# Patient Record
Sex: MALE | Race: BLACK OR AFRICAN AMERICAN | URBAN - METROPOLITAN AREA
[De-identification: names, ages, dates, MRNs, and addresses within clinical notes are randomized per-mention and may not be internally consistent; named-entity substitution may affect disease eponyms.]

---

## 2017-03-08 ENCOUNTER — TELEPHONE (OUTPATIENT)
Dept: FAMILY MEDICINE CLINIC | Age: 57
End: 2017-03-08

## 2017-03-08 DIAGNOSIS — Z12.5 SCREENING FOR PROSTATE CANCER: Primary | ICD-10-CM

## 2017-03-08 DIAGNOSIS — Z00.00 ANNUAL PHYSICAL EXAM: ICD-10-CM

## 2017-04-10 ENCOUNTER — CLINICAL DOCUMENTATION (OUTPATIENT)
Dept: INTERNAL MEDICINE CLINIC | Age: 57
End: 2017-04-10

## 2017-04-10 ENCOUNTER — OFFICE VISIT (OUTPATIENT)
Dept: FAMILY MEDICINE CLINIC | Age: 57
End: 2017-04-10

## 2017-04-10 VITALS
OXYGEN SATURATION: 98 % | HEIGHT: 72 IN | SYSTOLIC BLOOD PRESSURE: 124 MMHG | RESPIRATION RATE: 14 BRPM | BODY MASS INDEX: 27.6 KG/M2 | DIASTOLIC BLOOD PRESSURE: 82 MMHG | HEART RATE: 70 BPM | WEIGHT: 203.8 LBS

## 2017-04-10 DIAGNOSIS — Z11.4 SCREENING FOR HIV (HUMAN IMMUNODEFICIENCY VIRUS): ICD-10-CM

## 2017-04-10 DIAGNOSIS — I10 ESSENTIAL HYPERTENSION: ICD-10-CM

## 2017-04-10 DIAGNOSIS — L91.8 SKIN TAG: ICD-10-CM

## 2017-04-10 DIAGNOSIS — N40.0 BENIGN PROSTATIC HYPERPLASIA WITHOUT LOWER URINARY TRACT SYMPTOMS, UNSPECIFIED MORPHOLOGY: ICD-10-CM

## 2017-04-10 DIAGNOSIS — Z00.00 ANNUAL PHYSICAL EXAM: Primary | ICD-10-CM

## 2017-04-10 DIAGNOSIS — Z11.59 NEED FOR HEPATITIS C SCREENING TEST: ICD-10-CM

## 2017-04-10 DIAGNOSIS — Z00.00 ANNUAL PHYSICAL EXAM: ICD-10-CM

## 2017-04-10 DIAGNOSIS — N52.9 ERECTILE DYSFUNCTION, UNSPECIFIED ERECTILE DYSFUNCTION TYPE: ICD-10-CM

## 2017-04-10 LAB
A/G RATIO: 1.6 (ref 1.1–2.2)
ALBUMIN SERPL-MCNC: 4.5 G/DL (ref 3.4–5)
ALP BLD-CCNC: 52 U/L (ref 40–129)
ALT SERPL-CCNC: 31 U/L (ref 10–40)
ANION GAP SERPL CALCULATED.3IONS-SCNC: 15 MMOL/L (ref 3–16)
AST SERPL-CCNC: 23 U/L (ref 15–37)
BASOPHILS ABSOLUTE: 0 K/UL (ref 0–0.2)
BASOPHILS RELATIVE PERCENT: 0.6 %
BILIRUB SERPL-MCNC: 0.7 MG/DL (ref 0–1)
BILIRUBIN, POC: NORMAL
BLOOD URINE, POC: NORMAL
BUN BLDV-MCNC: 11 MG/DL (ref 7–20)
CALCIUM SERPL-MCNC: 9.1 MG/DL (ref 8.3–10.6)
CHLORIDE BLD-SCNC: 101 MMOL/L (ref 99–110)
CHOLESTEROL, TOTAL: 198 MG/DL (ref 0–199)
CLARITY, POC: CLEAR
CO2: 26 MMOL/L (ref 21–32)
COLOR, POC: YELLOW
CREAT SERPL-MCNC: 1.1 MG/DL (ref 0.9–1.3)
EOSINOPHILS ABSOLUTE: 0.1 K/UL (ref 0–0.6)
EOSINOPHILS RELATIVE PERCENT: 3.7 %
GFR AFRICAN AMERICAN: >60
GFR NON-AFRICAN AMERICAN: >60
GLOBULIN: 2.9 G/DL
GLUCOSE BLD-MCNC: 90 MG/DL (ref 70–99)
GLUCOSE URINE, POC: NORMAL
HCT VFR BLD CALC: 48.4 % (ref 40.5–52.5)
HDLC SERPL-MCNC: 44 MG/DL (ref 40–60)
HEMOGLOBIN: 16 G/DL (ref 13.5–17.5)
HEPATITIS C ANTIBODY INTERPRETATION: NORMAL
KETONES, POC: NORMAL
LDL CHOLESTEROL CALCULATED: 133 MG/DL
LEUKOCYTE EST, POC: NORMAL
LYMPHOCYTES ABSOLUTE: 0.8 K/UL (ref 1–5.1)
LYMPHOCYTES RELATIVE PERCENT: 34.9 %
MCH RBC QN AUTO: 28.2 PG (ref 26–34)
MCHC RBC AUTO-ENTMCNC: 33.1 G/DL (ref 31–36)
MCV RBC AUTO: 85.4 FL (ref 80–100)
MONOCYTES ABSOLUTE: 0.3 K/UL (ref 0–1.3)
MONOCYTES RELATIVE PERCENT: 12.5 %
NEUTROPHILS ABSOLUTE: 1.1 K/UL (ref 1.7–7.7)
NEUTROPHILS RELATIVE PERCENT: 48.3 %
NITRITE, POC: NORMAL
PDW BLD-RTO: 13.4 % (ref 12.4–15.4)
PH, POC: 6
PLATELET # BLD: 143 K/UL (ref 135–450)
PMV BLD AUTO: 10 FL (ref 5–10.5)
POTASSIUM SERPL-SCNC: 4.4 MMOL/L (ref 3.5–5.1)
PROSTATE SPECIFIC ANTIGEN: 0.67 NG/ML (ref 0–4)
PROTEIN, POC: NORMAL
RBC # BLD: 5.67 M/UL (ref 4.2–5.9)
SODIUM BLD-SCNC: 142 MMOL/L (ref 136–145)
SPECIFIC GRAVITY, POC: 1.01
TOTAL PROTEIN: 7.4 G/DL (ref 6.4–8.2)
TRIGL SERPL-MCNC: 104 MG/DL (ref 0–150)
UROBILINOGEN, POC: 0.2
VLDLC SERPL CALC-MCNC: 21 MG/DL
WBC # BLD: 2.3 K/UL (ref 4–11)

## 2017-04-10 PROCEDURE — 99396 PREV VISIT EST AGE 40-64: CPT | Performed by: FAMILY MEDICINE

## 2017-04-10 PROCEDURE — 81002 URINALYSIS NONAUTO W/O SCOPE: CPT | Performed by: FAMILY MEDICINE

## 2017-04-11 LAB — HIV-1 AND HIV-2 ANTIBODIES: NORMAL

## 2017-04-12 LAB — TESTOSTERONE TOTAL-MALE: 478 NG/DL (ref 300–890)

## 2017-09-19 ENCOUNTER — TELEPHONE (OUTPATIENT)
Dept: FAMILY MEDICINE CLINIC | Age: 57
End: 2017-09-19

## 2017-09-21 ENCOUNTER — OFFICE VISIT (OUTPATIENT)
Dept: FAMILY MEDICINE CLINIC | Age: 57
End: 2017-09-21

## 2017-09-21 VITALS
RESPIRATION RATE: 14 BRPM | HEART RATE: 63 BPM | SYSTOLIC BLOOD PRESSURE: 132 MMHG | DIASTOLIC BLOOD PRESSURE: 86 MMHG | WEIGHT: 213 LBS | BODY MASS INDEX: 29.29 KG/M2 | OXYGEN SATURATION: 98 %

## 2017-09-21 DIAGNOSIS — I10 ESSENTIAL HYPERTENSION: Primary | ICD-10-CM

## 2017-09-21 DIAGNOSIS — Z13.220 LIPID SCREENING: ICD-10-CM

## 2017-09-21 LAB
CHOLESTEROL, TOTAL: 199 MG/DL (ref 0–199)
GLUCOSE BLD-MCNC: 120 MG/DL (ref 70–99)
HDLC SERPL-MCNC: 41 MG/DL (ref 40–60)
LDL CHOLESTEROL CALCULATED: 134 MG/DL
TRIGL SERPL-MCNC: 119 MG/DL (ref 0–150)
VLDLC SERPL CALC-MCNC: 24 MG/DL

## 2017-09-21 PROCEDURE — 99213 OFFICE O/P EST LOW 20 MIN: CPT | Performed by: FAMILY MEDICINE

## 2017-09-21 ASSESSMENT — PATIENT HEALTH QUESTIONNAIRE - PHQ9
1. LITTLE INTEREST OR PLEASURE IN DOING THINGS: 0
2. FEELING DOWN, DEPRESSED OR HOPELESS: 0
SUM OF ALL RESPONSES TO PHQ QUESTIONS 1-9: 0
SUM OF ALL RESPONSES TO PHQ9 QUESTIONS 1 & 2: 0

## 2017-09-25 ENCOUNTER — TELEPHONE (OUTPATIENT)
Dept: FAMILY MEDICINE CLINIC | Age: 57
End: 2017-09-25

## 2017-09-25 DIAGNOSIS — R73.01 ELEVATED FASTING GLUCOSE: Primary | ICD-10-CM

## 2017-09-30 LAB
ESTIMATED AVERAGE GLUCOSE: 137 MG/DL
HBA1C MFR BLD: 6.4 %

## 2017-10-03 ENCOUNTER — TELEPHONE (OUTPATIENT)
Dept: FAMILY MEDICINE CLINIC | Age: 57
End: 2017-10-03

## 2017-10-03 ENCOUNTER — CARE COORDINATION (OUTPATIENT)
Dept: CARE COORDINATION | Age: 57
End: 2017-10-03

## 2017-10-03 NOTE — CARE COORDINATION
Per PCP's request pt mailed DM Class Flyers and the following DM Ed materials to review. Pt aware he can call writer once received and he has started reviewing materials. Pt mailed the following items:  - What is Diabetes  - Know Your Numbers  - Hypo/Hyperglycemia S/S & Tx  - Your Guide to Better office Visits  - Carb Counting & meal Planning  - Diabetes & You  - Contact information    If pt doesn't return call in next 2-3 weeks, writer will call pt.      Arnulfo CORTESN, RN Care Coordinator  [address] [place] [address]  [phone]

## 2017-10-03 NOTE — TELEPHONE ENCOUNTER
Patient was concerned about his ranges and if he was diabetic. He is currently residing in [place] and is going to have a hard time coming to the education class. He gave me his [place] address (his parent's house) and Sandee Nyhan is mailing him information out there. She also put her card in there in case he had any questions for her.

## 2017-10-06 ENCOUNTER — CARE COORDINATOR VISIT (OUTPATIENT)
Dept: CARE COORDINATION | Age: 57
End: 2017-10-06

## 2017-10-06 ASSESSMENT — PATIENT HEALTH QUESTIONNAIRE - PHQ9: SUM OF ALL RESPONSES TO PHQ QUESTIONS 1-9: 0

## 2017-10-17 ENCOUNTER — CARE COORDINATION (OUTPATIENT)
Dept: CARE COORDINATION | Age: 57
End: 2017-10-17

## 2017-10-17 NOTE — CARE COORDINATION
Outreach call attempted. Unable to reach pt. Message left with reason for call requesting call back.      Lillie CORTESN, RN Care Coordinator  [address] [place] [address]  [phone]

## 2018-08-17 ENCOUNTER — TELEPHONE (OUTPATIENT)
Dept: ADMINISTRATIVE | Age: 58
End: 2018-08-17

## 2018-08-17 DIAGNOSIS — R73.03 PREDIABETES: Primary | ICD-10-CM

## 2018-08-17 DIAGNOSIS — I10 ESSENTIAL HYPERTENSION: ICD-10-CM

## 2018-08-17 DIAGNOSIS — N40.0 BENIGN PROSTATIC HYPERPLASIA WITHOUT LOWER URINARY TRACT SYMPTOMS: ICD-10-CM

## 2018-08-27 ENCOUNTER — OFFICE VISIT (OUTPATIENT)
Dept: FAMILY MEDICINE CLINIC | Age: 58
End: 2018-08-27

## 2018-08-27 VITALS
OXYGEN SATURATION: 97 % | SYSTOLIC BLOOD PRESSURE: 122 MMHG | HEIGHT: 72 IN | HEART RATE: 72 BPM | DIASTOLIC BLOOD PRESSURE: 78 MMHG | WEIGHT: 209 LBS | RESPIRATION RATE: 15 BRPM | BODY MASS INDEX: 28.31 KG/M2

## 2018-08-27 DIAGNOSIS — N40.0 BENIGN PROSTATIC HYPERPLASIA WITHOUT LOWER URINARY TRACT SYMPTOMS: ICD-10-CM

## 2018-08-27 DIAGNOSIS — I10 ESSENTIAL HYPERTENSION: ICD-10-CM

## 2018-08-27 DIAGNOSIS — Z00.00 ANNUAL PHYSICAL EXAM: ICD-10-CM

## 2018-08-27 DIAGNOSIS — R73.03 PREDIABETES: ICD-10-CM

## 2018-08-27 DIAGNOSIS — D70.9 NEUTROPENIA, UNSPECIFIED TYPE (HCC): ICD-10-CM

## 2018-08-27 DIAGNOSIS — E55.9 VITAMIN D DEFICIENCY: ICD-10-CM

## 2018-08-27 DIAGNOSIS — Z00.00 ANNUAL PHYSICAL EXAM: Primary | ICD-10-CM

## 2018-08-27 LAB
A/G RATIO: 1.6 (ref 1.1–2.2)
ALBUMIN SERPL-MCNC: 4.4 G/DL (ref 3.4–5)
ALP BLD-CCNC: 55 U/L (ref 40–129)
ALT SERPL-CCNC: 28 U/L (ref 10–40)
ANION GAP SERPL CALCULATED.3IONS-SCNC: 11 MMOL/L (ref 3–16)
AST SERPL-CCNC: 25 U/L (ref 15–37)
BASOPHILS ABSOLUTE: 0 K/UL (ref 0–0.2)
BASOPHILS RELATIVE PERCENT: 0.7 %
BILIRUB SERPL-MCNC: 0.6 MG/DL (ref 0–1)
BILIRUBIN, POC: NORMAL
BLOOD URINE, POC: NORMAL
BUN BLDV-MCNC: 11 MG/DL (ref 7–20)
CALCIUM SERPL-MCNC: 9.1 MG/DL (ref 8.3–10.6)
CHLORIDE BLD-SCNC: 98 MMOL/L (ref 99–110)
CHOLESTEROL, TOTAL: 186 MG/DL (ref 0–199)
CLARITY, POC: CLEAR
CO2: 29 MMOL/L (ref 21–32)
COLOR, POC: YELLOW
CREAT SERPL-MCNC: 1.2 MG/DL (ref 0.9–1.3)
EOSINOPHILS ABSOLUTE: 0.3 K/UL (ref 0–0.6)
EOSINOPHILS RELATIVE PERCENT: 9.4 %
GFR AFRICAN AMERICAN: >60
GFR NON-AFRICAN AMERICAN: >60
GLOBULIN: 2.8 G/DL
GLUCOSE BLD-MCNC: 111 MG/DL (ref 70–99)
GLUCOSE URINE, POC: NORMAL
HCT VFR BLD CALC: 45.7 % (ref 40.5–52.5)
HDLC SERPL-MCNC: 43 MG/DL (ref 40–60)
HEMOGLOBIN: 15.3 G/DL (ref 13.5–17.5)
KETONES, POC: NORMAL
LDL CHOLESTEROL CALCULATED: 125 MG/DL
LEUKOCYTE EST, POC: NORMAL
LYMPHOCYTES ABSOLUTE: 0.7 K/UL (ref 1–5.1)
LYMPHOCYTES RELATIVE PERCENT: 24.5 %
MCH RBC QN AUTO: 28.3 PG (ref 26–34)
MCHC RBC AUTO-ENTMCNC: 33.4 G/DL (ref 31–36)
MCV RBC AUTO: 84.6 FL (ref 80–100)
MONOCYTES ABSOLUTE: 0.3 K/UL (ref 0–1.3)
MONOCYTES RELATIVE PERCENT: 10.5 %
NEUTROPHILS ABSOLUTE: 1.5 K/UL (ref 1.7–7.7)
NEUTROPHILS RELATIVE PERCENT: 54.9 %
NITRITE, POC: NORMAL
PDW BLD-RTO: 13.5 % (ref 12.4–15.4)
PH, POC: 6
PLATELET # BLD: 140 K/UL (ref 135–450)
PMV BLD AUTO: 9.7 FL (ref 5–10.5)
POTASSIUM SERPL-SCNC: 3.9 MMOL/L (ref 3.5–5.1)
PROSTATE SPECIFIC ANTIGEN: 0.7 NG/ML (ref 0–4)
PROTEIN, POC: NORMAL
RBC # BLD: 5.41 M/UL (ref 4.2–5.9)
SODIUM BLD-SCNC: 138 MMOL/L (ref 136–145)
SPECIFIC GRAVITY, POC: 1.01
TOTAL PROTEIN: 7.2 G/DL (ref 6.4–8.2)
TRIGL SERPL-MCNC: 91 MG/DL (ref 0–150)
TSH REFLEX: 1.61 UIU/ML (ref 0.27–4.2)
UROBILINOGEN, POC: 0.2
VITAMIN D 25-HYDROXY: 19.1 NG/ML
VLDLC SERPL CALC-MCNC: 18 MG/DL
WBC # BLD: 2.8 K/UL (ref 4–11)

## 2018-08-27 PROCEDURE — 99396 PREV VISIT EST AGE 40-64: CPT | Performed by: FAMILY MEDICINE

## 2018-08-27 PROCEDURE — 90471 IMMUNIZATION ADMIN: CPT | Performed by: FAMILY MEDICINE

## 2018-08-27 PROCEDURE — 81002 URINALYSIS NONAUTO W/O SCOPE: CPT | Performed by: FAMILY MEDICINE

## 2018-08-27 PROCEDURE — 90750 HZV VACC RECOMBINANT IM: CPT | Performed by: FAMILY MEDICINE

## 2018-08-27 NOTE — PROGRESS NOTES
SUBJECTIVE:   Serina Jha is a 63 y.o. male presenting for his annual checkup. Intermittently not sleeping well. Has had a lot of work stress with new job. Has not tried any OTC meds. Patient Active Problem List   Diagnosis    Hypertension    Vitamin D deficiency    ED (erectile dysfunction)    Neutropenia (HCC)    BPH (benign prostatic hyperplasia)    Prediabetes       Past Medical History:   Diagnosis Date    BPH (benign prostatic hyperplasia)     ED (erectile dysfunction)     Hypertension     diet controlled    Neutropenia ([address]     Prediabetes     Vitamin D deficiency 5/2011       Past Surgical History:   Procedure Laterality Date    COLONOSCOPY  6/5/14    10 years       Social History     Social History    Marital status:      Spouse name: N/A    Number of children: 2    Years of education: N/A     Occupational History     at [employer]      Social History Main Topics    Smoking status: Never Smoker    Smokeless tobacco: Never Used    Alcohol use Yes      Comment: Occ.  Drug use: No    Sexual activity: Not Asked     Other Topics Concern    None     Social History Narrative    None       Family History   Problem Relation Age of Onset    Cancer Father         prostate    Cancer Maternal Grandmother         colon    Diabetes Brother         type II    Diabetes Son         type I       No current outpatient prescriptions on file. No current facility-administered medications for this visit.         Allergies: Lisinopril     Health Maintenance   Topic Date Due    Shingles Vaccine (1 of 2 - 2 Dose Series) 08/24/2011    Flu vaccine (1) 09/21/2018 (Originally 9/1/2018)    A1C test (Diabetic or Prediabetic)  09/29/2018    DTaP/Tdap/Td vaccine (2 - Tdap) 08/01/2020    Lipid screen  09/21/2022    Colon cancer screen colonoscopy  06/05/2024    Hepatitis C screen  Completed    HIV screen  Completed           ROS:    Skin: no changing moles, abnormal

## 2018-08-28 DIAGNOSIS — E55.9 VITAMIN D DEFICIENCY: Primary | ICD-10-CM

## 2018-08-28 DIAGNOSIS — E11.9 TYPE 2 DIABETES MELLITUS WITHOUT COMPLICATION, WITHOUT LONG-TERM CURRENT USE OF INSULIN (HCC): ICD-10-CM

## 2018-08-28 LAB
ESTIMATED AVERAGE GLUCOSE: 139.9 MG/DL
HBA1C MFR BLD: 6.5 %

## 2018-09-06 ENCOUNTER — TELEPHONE (OUTPATIENT)
Dept: FAMILY MEDICINE CLINIC | Age: 58
End: 2018-09-06

## 2018-09-06 DIAGNOSIS — E55.9 VITAMIN D DEFICIENCY: ICD-10-CM

## 2018-09-06 NOTE — TELEPHONE ENCOUNTER
Patient received blood test results but has more questions. He would not specify but would like someone to call him back.

## 2018-10-01 ENCOUNTER — OFFICE VISIT (OUTPATIENT)
Dept: FAMILY MEDICINE CLINIC | Age: 58
End: 2018-10-01
Payer: COMMERCIAL

## 2018-10-01 VITALS
HEART RATE: 78 BPM | WEIGHT: 206 LBS | DIASTOLIC BLOOD PRESSURE: 80 MMHG | RESPIRATION RATE: 16 BRPM | OXYGEN SATURATION: 97 % | SYSTOLIC BLOOD PRESSURE: 124 MMHG | BODY MASS INDEX: 28.33 KG/M2

## 2018-10-01 DIAGNOSIS — E78.00 PURE HYPERCHOLESTEROLEMIA: ICD-10-CM

## 2018-10-01 DIAGNOSIS — E55.9 VITAMIN D DEFICIENCY: ICD-10-CM

## 2018-10-01 DIAGNOSIS — D70.9 NEUTROPENIA, UNSPECIFIED TYPE (HCC): ICD-10-CM

## 2018-10-01 DIAGNOSIS — E11.9 TYPE 2 DIABETES MELLITUS WITHOUT COMPLICATION, WITHOUT LONG-TERM CURRENT USE OF INSULIN (HCC): Primary | ICD-10-CM

## 2018-10-01 LAB
CREATININE URINE POCT: 200
MICROALBUMIN/CREAT 24H UR: 150 MG/G{CREAT}
MICROALBUMIN/CREAT UR-RTO: >300

## 2018-10-01 PROCEDURE — G8419 CALC BMI OUT NRM PARAM NOF/U: HCPCS | Performed by: FAMILY MEDICINE

## 2018-10-01 PROCEDURE — 3017F COLORECTAL CA SCREEN DOC REV: CPT | Performed by: FAMILY MEDICINE

## 2018-10-01 PROCEDURE — 1036F TOBACCO NON-USER: CPT | Performed by: FAMILY MEDICINE

## 2018-10-01 PROCEDURE — 99214 OFFICE O/P EST MOD 30 MIN: CPT | Performed by: FAMILY MEDICINE

## 2018-10-01 PROCEDURE — 82044 UR ALBUMIN SEMIQUANTITATIVE: CPT | Performed by: FAMILY MEDICINE

## 2018-10-01 PROCEDURE — 3044F HG A1C LEVEL LT 7.0%: CPT | Performed by: FAMILY MEDICINE

## 2018-10-01 PROCEDURE — G8484 FLU IMMUNIZE NO ADMIN: HCPCS | Performed by: FAMILY MEDICINE

## 2018-10-01 PROCEDURE — G8427 DOCREV CUR MEDS BY ELIG CLIN: HCPCS | Performed by: FAMILY MEDICINE

## 2018-10-01 PROCEDURE — 2022F DILAT RTA XM EVC RTNOPTHY: CPT | Performed by: FAMILY MEDICINE

## 2018-10-01 ASSESSMENT — PATIENT HEALTH QUESTIONNAIRE - PHQ9
SUM OF ALL RESPONSES TO PHQ QUESTIONS 1-9: 0
SUM OF ALL RESPONSES TO PHQ QUESTIONS 1-9: 0
SUM OF ALL RESPONSES TO PHQ9 QUESTIONS 1 & 2: 0
1. LITTLE INTEREST OR PLEASURE IN DOING THINGS: 0
2. FEELING DOWN, DEPRESSED OR HOPELESS: 0

## 2019-01-14 ENCOUNTER — OFFICE VISIT (OUTPATIENT)
Dept: FAMILY MEDICINE CLINIC | Age: 59
End: 2019-01-14
Payer: COMMERCIAL

## 2019-01-14 VITALS
SYSTOLIC BLOOD PRESSURE: 126 MMHG | RESPIRATION RATE: 14 BRPM | HEART RATE: 67 BPM | HEIGHT: 72 IN | BODY MASS INDEX: 28.17 KG/M2 | DIASTOLIC BLOOD PRESSURE: 80 MMHG | WEIGHT: 208 LBS | OXYGEN SATURATION: 98 %

## 2019-01-14 DIAGNOSIS — E11.9 TYPE 2 DIABETES MELLITUS WITHOUT COMPLICATION, WITHOUT LONG-TERM CURRENT USE OF INSULIN (HCC): Primary | ICD-10-CM

## 2019-01-14 DIAGNOSIS — D70.9 NEUTROPENIA, UNSPECIFIED TYPE (HCC): ICD-10-CM

## 2019-01-14 DIAGNOSIS — H91.92 HEARING TROUBLE, LEFT: ICD-10-CM

## 2019-01-14 DIAGNOSIS — R53.83 FATIGUE, UNSPECIFIED TYPE: ICD-10-CM

## 2019-01-14 DIAGNOSIS — N52.9 ERECTILE DYSFUNCTION, UNSPECIFIED ERECTILE DYSFUNCTION TYPE: ICD-10-CM

## 2019-01-14 DIAGNOSIS — D22.9 NUMEROUS MOLES: ICD-10-CM

## 2019-01-14 DIAGNOSIS — I10 ESSENTIAL HYPERTENSION: ICD-10-CM

## 2019-01-14 DIAGNOSIS — E11.9 TYPE 2 DIABETES MELLITUS WITHOUT COMPLICATION, WITHOUT LONG-TERM CURRENT USE OF INSULIN (HCC): ICD-10-CM

## 2019-01-14 DIAGNOSIS — E78.00 PURE HYPERCHOLESTEROLEMIA: ICD-10-CM

## 2019-01-14 DIAGNOSIS — E55.9 VITAMIN D DEFICIENCY: ICD-10-CM

## 2019-01-14 LAB
A/G RATIO: 1.6 (ref 1.1–2.2)
ALBUMIN SERPL-MCNC: 4.5 G/DL (ref 3.4–5)
ALP BLD-CCNC: 47 U/L (ref 40–129)
ALT SERPL-CCNC: 26 U/L (ref 10–40)
ANION GAP SERPL CALCULATED.3IONS-SCNC: 12 MMOL/L (ref 3–16)
AST SERPL-CCNC: 21 U/L (ref 15–37)
BASOPHILS ABSOLUTE: 0 K/UL (ref 0–0.2)
BASOPHILS RELATIVE PERCENT: 0.4 %
BILIRUB SERPL-MCNC: 0.6 MG/DL (ref 0–1)
BUN BLDV-MCNC: 14 MG/DL (ref 7–20)
CALCIUM SERPL-MCNC: 9 MG/DL (ref 8.3–10.6)
CHLORIDE BLD-SCNC: 100 MMOL/L (ref 99–110)
CO2: 28 MMOL/L (ref 21–32)
CREAT SERPL-MCNC: 1.1 MG/DL (ref 0.9–1.3)
EOSINOPHILS ABSOLUTE: 0.2 K/UL (ref 0–0.6)
EOSINOPHILS RELATIVE PERCENT: 6.3 %
ESTIMATED AVERAGE GLUCOSE: 116.9 MG/DL
GFR AFRICAN AMERICAN: >60
GFR NON-AFRICAN AMERICAN: >60
GLOBULIN: 2.9 G/DL
GLUCOSE BLD-MCNC: 91 MG/DL (ref 70–99)
HBA1C MFR BLD: 5.7 %
HCT VFR BLD CALC: 46.5 % (ref 40.5–52.5)
HEMOGLOBIN: 15.2 G/DL (ref 13.5–17.5)
LYMPHOCYTES ABSOLUTE: 0.8 K/UL (ref 1–5.1)
LYMPHOCYTES RELATIVE PERCENT: 32.3 %
MCH RBC QN AUTO: 27.9 PG (ref 26–34)
MCHC RBC AUTO-ENTMCNC: 32.7 G/DL (ref 31–36)
MCV RBC AUTO: 85.1 FL (ref 80–100)
MONOCYTES ABSOLUTE: 0.3 K/UL (ref 0–1.3)
MONOCYTES RELATIVE PERCENT: 10.3 %
NEUTROPHILS ABSOLUTE: 1.2 K/UL (ref 1.7–7.7)
NEUTROPHILS RELATIVE PERCENT: 50.7 %
PDW BLD-RTO: 12.9 % (ref 12.4–15.4)
PLATELET # BLD: 140 K/UL (ref 135–450)
PMV BLD AUTO: 9.8 FL (ref 5–10.5)
POTASSIUM SERPL-SCNC: 3.9 MMOL/L (ref 3.5–5.1)
RBC # BLD: 5.47 M/UL (ref 4.2–5.9)
SODIUM BLD-SCNC: 140 MMOL/L (ref 136–145)
TOTAL PROTEIN: 7.4 G/DL (ref 6.4–8.2)
TSH REFLEX: 2.54 UIU/ML (ref 0.27–4.2)
VITAMIN D 25-HYDROXY: 55.9 NG/ML
WBC # BLD: 2.5 K/UL (ref 4–11)

## 2019-01-14 PROCEDURE — G8419 CALC BMI OUT NRM PARAM NOF/U: HCPCS | Performed by: FAMILY MEDICINE

## 2019-01-14 PROCEDURE — 99214 OFFICE O/P EST MOD 30 MIN: CPT | Performed by: FAMILY MEDICINE

## 2019-01-14 PROCEDURE — G8484 FLU IMMUNIZE NO ADMIN: HCPCS | Performed by: FAMILY MEDICINE

## 2019-01-14 PROCEDURE — 3046F HEMOGLOBIN A1C LEVEL >9.0%: CPT | Performed by: FAMILY MEDICINE

## 2019-01-14 PROCEDURE — 90471 IMMUNIZATION ADMIN: CPT | Performed by: FAMILY MEDICINE

## 2019-01-14 PROCEDURE — 2022F DILAT RTA XM EVC RTNOPTHY: CPT | Performed by: FAMILY MEDICINE

## 2019-01-14 PROCEDURE — 1036F TOBACCO NON-USER: CPT | Performed by: FAMILY MEDICINE

## 2019-01-14 PROCEDURE — 90732 PPSV23 VACC 2 YRS+ SUBQ/IM: CPT | Performed by: FAMILY MEDICINE

## 2019-01-14 PROCEDURE — 3017F COLORECTAL CA SCREEN DOC REV: CPT | Performed by: FAMILY MEDICINE

## 2019-01-14 PROCEDURE — G8427 DOCREV CUR MEDS BY ELIG CLIN: HCPCS | Performed by: FAMILY MEDICINE

## 2019-01-16 LAB
SEX HORMONE BINDING GLOBULIN: 47 NMOL/L (ref 11–80)
TESTOSTERONE FREE-NONMALE: 72.8 PG/ML (ref 47–244)
TESTOSTERONE TOTAL: 444 NG/DL (ref 220–1000)

## 2019-01-17 ENCOUNTER — TELEPHONE (OUTPATIENT)
Dept: FAMILY MEDICINE CLINIC | Age: 59
End: 2019-01-17

## 2019-01-17 LAB
HIV AG/AB: NORMAL
HIV ANTIGEN: NORMAL
HIV-1 ANTIBODY: NORMAL
HIV-2 AB: NORMAL

## 2019-05-23 ENCOUNTER — TELEPHONE (OUTPATIENT)
Dept: FAMILY MEDICINE CLINIC | Age: 59
End: 2019-05-23

## 2025-01-19 ENCOUNTER — HOSPITAL ENCOUNTER (EMERGENCY)
Age: 65
Discharge: ANOTHER HOSPITAL | End: 2025-01-19
Attending: EMERGENCY MEDICINE
Payer: COMMERCIAL

## 2025-01-19 VITALS
WEIGHT: 201.7 LBS | HEART RATE: 87 BPM | RESPIRATION RATE: 18 BRPM | DIASTOLIC BLOOD PRESSURE: 95 MMHG | BODY MASS INDEX: 28.24 KG/M2 | TEMPERATURE: 97.4 F | HEIGHT: 71 IN | SYSTOLIC BLOOD PRESSURE: 141 MMHG | OXYGEN SATURATION: 97 %

## 2025-01-19 DIAGNOSIS — T23.029A BURN INJURY OF SKIN OF FINGER: Primary | ICD-10-CM

## 2025-01-19 DIAGNOSIS — T20.20XA FACIAL BURN, SECOND DEGREE, INITIAL ENCOUNTER: ICD-10-CM

## 2025-01-19 DIAGNOSIS — Z86.39 HISTORY OF DIABETES MELLITUS: ICD-10-CM

## 2025-01-19 DIAGNOSIS — I10 ESSENTIAL HYPERTENSION: ICD-10-CM

## 2025-01-19 LAB
ANION GAP SERPL CALCULATED.3IONS-SCNC: 14 MMOL/L (ref 3–16)
BASOPHILS # BLD: 0 K/UL (ref 0–0.2)
BASOPHILS NFR BLD: 0.6 %
BUN SERPL-MCNC: 14 MG/DL (ref 7–20)
CALCIUM SERPL-MCNC: 9.6 MG/DL (ref 8.3–10.6)
CHLORIDE SERPL-SCNC: 101 MMOL/L (ref 99–110)
CO2 SERPL-SCNC: 24 MMOL/L (ref 21–32)
CREAT SERPL-MCNC: 1.1 MG/DL (ref 0.8–1.3)
DEPRECATED RDW RBC AUTO: 13.7 % (ref 12.4–15.4)
EOSINOPHIL # BLD: 0.1 K/UL (ref 0–0.6)
EOSINOPHIL NFR BLD: 3.2 %
GFR SERPLBLD CREATININE-BSD FMLA CKD-EPI: 75 ML/MIN/{1.73_M2}
GLUCOSE SERPL-MCNC: 128 MG/DL (ref 70–99)
HCT VFR BLD AUTO: 44.6 % (ref 40.5–52.5)
HGB BLD-MCNC: 15.1 G/DL (ref 13.5–17.5)
LYMPHOCYTES # BLD: 1.4 K/UL (ref 1–5.1)
LYMPHOCYTES NFR BLD: 38.8 %
MAGNESIUM SERPL-MCNC: 1.9 MG/DL (ref 1.8–2.4)
MCH RBC QN AUTO: 28.3 PG (ref 26–34)
MCHC RBC AUTO-ENTMCNC: 33.8 G/DL (ref 31–36)
MCV RBC AUTO: 83.7 FL (ref 80–100)
MONOCYTES # BLD: 0.4 K/UL (ref 0–1.3)
MONOCYTES NFR BLD: 11.6 %
NEUTROPHILS # BLD: 1.7 K/UL (ref 1.7–7.7)
NEUTROPHILS NFR BLD: 45.8 %
PLATELET # BLD AUTO: 176 K/UL (ref 135–450)
PMV BLD AUTO: 8.3 FL (ref 5–10.5)
POTASSIUM SERPL-SCNC: 3.4 MMOL/L (ref 3.5–5.1)
RBC # BLD AUTO: 5.33 M/UL (ref 4.2–5.9)
SODIUM SERPL-SCNC: 139 MMOL/L (ref 136–145)
WBC # BLD AUTO: 3.7 K/UL (ref 4–11)

## 2025-01-19 PROCEDURE — 80048 BASIC METABOLIC PNL TOTAL CA: CPT

## 2025-01-19 PROCEDURE — 96375 TX/PRO/DX INJ NEW DRUG ADDON: CPT

## 2025-01-19 PROCEDURE — 85025 COMPLETE CBC W/AUTO DIFF WBC: CPT

## 2025-01-19 PROCEDURE — 96374 THER/PROPH/DIAG INJ IV PUSH: CPT

## 2025-01-19 PROCEDURE — 6370000000 HC RX 637 (ALT 250 FOR IP): Performed by: EMERGENCY MEDICINE

## 2025-01-19 PROCEDURE — 2580000003 HC RX 258: Performed by: EMERGENCY MEDICINE

## 2025-01-19 PROCEDURE — 99285 EMERGENCY DEPT VISIT HI MDM: CPT

## 2025-01-19 PROCEDURE — 6360000002 HC RX W HCPCS: Performed by: EMERGENCY MEDICINE

## 2025-01-19 PROCEDURE — 83735 ASSAY OF MAGNESIUM: CPT

## 2025-01-19 RX ORDER — 0.9 % SODIUM CHLORIDE 0.9 %
1000 INTRAVENOUS SOLUTION INTRAVENOUS ONCE
Status: COMPLETED | OUTPATIENT
Start: 2025-01-19 | End: 2025-01-19

## 2025-01-19 RX ORDER — MORPHINE SULFATE 10 MG/ML
6 INJECTION, SOLUTION INTRAMUSCULAR; INTRAVENOUS ONCE
Status: COMPLETED | OUTPATIENT
Start: 2025-01-19 | End: 2025-01-19

## 2025-01-19 RX ORDER — HYDROCHLOROTHIAZIDE 12.5 MG/1
12.5 CAPSULE ORAL DAILY
COMMUNITY
Start: 2024-10-14 | End: 2025-04-12

## 2025-01-19 RX ORDER — MUPIROCIN 20 MG/G
OINTMENT TOPICAL ONCE
Status: DISCONTINUED | OUTPATIENT
Start: 2025-01-19 | End: 2025-01-19 | Stop reason: HOSPADM

## 2025-01-19 RX ORDER — ONDANSETRON 2 MG/ML
4 INJECTION INTRAMUSCULAR; INTRAVENOUS ONCE
Status: COMPLETED | OUTPATIENT
Start: 2025-01-19 | End: 2025-01-19

## 2025-01-19 RX ORDER — LOSARTAN POTASSIUM 50 MG/1
50 TABLET ORAL 2 TIMES DAILY
COMMUNITY
Start: 2024-10-14 | End: 2025-04-12

## 2025-01-19 RX ORDER — ATORVASTATIN CALCIUM 40 MG/1
40 TABLET, FILM COATED ORAL DAILY
COMMUNITY

## 2025-01-19 RX ORDER — ACETAMINOPHEN 500 MG
1000 TABLET ORAL ONCE
Status: COMPLETED | OUTPATIENT
Start: 2025-01-19 | End: 2025-01-19

## 2025-01-19 RX ORDER — AMLODIPINE BESYLATE 10 MG/1
10 TABLET ORAL DAILY
COMMUNITY
Start: 2024-10-14 | End: 2025-04-12

## 2025-01-19 RX ORDER — POTASSIUM CHLORIDE 1500 MG/1
40 TABLET, EXTENDED RELEASE ORAL ONCE
Status: COMPLETED | OUTPATIENT
Start: 2025-01-19 | End: 2025-01-19

## 2025-01-19 RX ORDER — KETOROLAC TROMETHAMINE 15 MG/ML
15 INJECTION, SOLUTION INTRAMUSCULAR; INTRAVENOUS ONCE
Status: COMPLETED | OUTPATIENT
Start: 2025-01-19 | End: 2025-01-19

## 2025-01-19 RX ADMIN — KETOROLAC TROMETHAMINE 15 MG: 15 INJECTION, SOLUTION INTRAMUSCULAR; INTRAVENOUS at 14:32

## 2025-01-19 RX ADMIN — SODIUM CHLORIDE 1000 ML: 9 INJECTION, SOLUTION INTRAVENOUS at 14:31

## 2025-01-19 RX ADMIN — POTASSIUM CHLORIDE 40 MEQ: 1500 TABLET, EXTENDED RELEASE ORAL at 15:58

## 2025-01-19 RX ADMIN — ACETAMINOPHEN 1000 MG: 500 TABLET ORAL at 14:31

## 2025-01-19 RX ADMIN — MORPHINE SULFATE 6 MG: 10 INJECTION, SOLUTION INTRAMUSCULAR; INTRAVENOUS at 14:32

## 2025-01-19 RX ADMIN — ONDANSETRON 4 MG: 2 INJECTION, SOLUTION INTRAMUSCULAR; INTRAVENOUS at 14:31

## 2025-01-19 ASSESSMENT — LIFESTYLE VARIABLES
HOW OFTEN DO YOU HAVE A DRINK CONTAINING ALCOHOL: MONTHLY OR LESS
HOW MANY STANDARD DRINKS CONTAINING ALCOHOL DO YOU HAVE ON A TYPICAL DAY: 1 OR 2

## 2025-01-19 ASSESSMENT — PAIN SCALES - GENERAL: PAINLEVEL_OUTOF10: 10

## 2025-01-19 ASSESSMENT — PAIN - FUNCTIONAL ASSESSMENT: PAIN_FUNCTIONAL_ASSESSMENT: 0-10
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
